# Patient Record
Sex: FEMALE | Race: WHITE
[De-identification: names, ages, dates, MRNs, and addresses within clinical notes are randomized per-mention and may not be internally consistent; named-entity substitution may affect disease eponyms.]

---

## 2018-07-13 ENCOUNTER — HOSPITAL ENCOUNTER (EMERGENCY)
Dept: HOSPITAL 47 - EC | Age: 43
Discharge: HOME | End: 2018-07-13
Payer: COMMERCIAL

## 2018-07-13 VITALS
TEMPERATURE: 97.4 F | RESPIRATION RATE: 17 BRPM | DIASTOLIC BLOOD PRESSURE: 84 MMHG | HEART RATE: 72 BPM | SYSTOLIC BLOOD PRESSURE: 134 MMHG

## 2018-07-13 DIAGNOSIS — I44.0: ICD-10-CM

## 2018-07-13 DIAGNOSIS — R74.8: ICD-10-CM

## 2018-07-13 DIAGNOSIS — R11.0: ICD-10-CM

## 2018-07-13 DIAGNOSIS — Z79.51: ICD-10-CM

## 2018-07-13 DIAGNOSIS — E07.9: ICD-10-CM

## 2018-07-13 DIAGNOSIS — Z79.899: ICD-10-CM

## 2018-07-13 DIAGNOSIS — E87.6: Primary | ICD-10-CM

## 2018-07-13 DIAGNOSIS — F41.9: ICD-10-CM

## 2018-07-13 DIAGNOSIS — Z90.710: ICD-10-CM

## 2018-07-13 DIAGNOSIS — I10: ICD-10-CM

## 2018-07-13 DIAGNOSIS — F32.9: ICD-10-CM

## 2018-07-13 DIAGNOSIS — Z88.5: ICD-10-CM

## 2018-07-13 DIAGNOSIS — R14.0: ICD-10-CM

## 2018-07-13 DIAGNOSIS — Z90.49: ICD-10-CM

## 2018-07-13 LAB
ALBUMIN SERPL-MCNC: 3.2 G/DL (ref 3.5–5)
ALP SERPL-CCNC: 49 U/L (ref 38–126)
ALT SERPL-CCNC: 102 U/L (ref 9–52)
AMYLASE SERPL-CCNC: 44 U/L (ref 30–110)
ANION GAP SERPL CALC-SCNC: 9 MMOL/L
AST SERPL-CCNC: 154 U/L (ref 14–36)
BASOPHILS # BLD AUTO: 0.1 K/UL (ref 0–0.2)
BASOPHILS NFR BLD AUTO: 1 %
BUN SERPL-SCNC: 12 MG/DL (ref 7–17)
CALCIUM SPEC-MCNC: 7.3 MG/DL (ref 8.4–10.2)
CHLORIDE SERPL-SCNC: 112 MMOL/L (ref 98–107)
CO2 SERPL-SCNC: 20 MMOL/L (ref 22–30)
EOSINOPHIL # BLD AUTO: 0.2 K/UL (ref 0–0.7)
EOSINOPHIL NFR BLD AUTO: 2 %
ERYTHROCYTE [DISTWIDTH] IN BLOOD BY AUTOMATED COUNT: 4.05 M/UL (ref 3.8–5.4)
ERYTHROCYTE [DISTWIDTH] IN BLOOD: 13.5 % (ref 11.5–15.5)
GLUCOSE SERPL-MCNC: 85 MG/DL (ref 74–99)
HCT VFR BLD AUTO: 36.6 % (ref 34–46)
HGB BLD-MCNC: 12.2 GM/DL (ref 11.4–16)
LIPASE SERPL-CCNC: 171 U/L (ref 23–300)
LYMPHOCYTES # SPEC AUTO: 1.4 K/UL (ref 1–4.8)
LYMPHOCYTES NFR SPEC AUTO: 14 %
MCH RBC QN AUTO: 30.2 PG (ref 25–35)
MCHC RBC AUTO-ENTMCNC: 33.4 G/DL (ref 31–37)
MCV RBC AUTO: 90.4 FL (ref 80–100)
MONOCYTES # BLD AUTO: 0.6 K/UL (ref 0–1)
MONOCYTES NFR BLD AUTO: 6 %
NEUTROPHILS # BLD AUTO: 7.6 K/UL (ref 1.3–7.7)
NEUTROPHILS NFR BLD AUTO: 76 %
PH UR: 5 [PH] (ref 5–8)
PLATELET # BLD AUTO: 254 K/UL (ref 150–450)
POTASSIUM SERPL-SCNC: 2.9 MMOL/L (ref 3.5–5.1)
PROT SERPL-MCNC: 5.7 G/DL (ref 6.3–8.2)
RBC UR QL: 2 /HPF (ref 0–5)
SODIUM SERPL-SCNC: 141 MMOL/L (ref 137–145)
SP GR UR: 1.01 (ref 1–1.03)
SQUAMOUS UR QL AUTO: 7 /HPF (ref 0–4)
UROBILINOGEN UR QL STRIP: <2 MG/DL (ref ?–2)
WBC # BLD AUTO: 10 K/UL (ref 3.8–10.6)
WBC #/AREA URNS HPF: 4 /HPF (ref 0–5)

## 2018-07-13 PROCEDURE — 82150 ASSAY OF AMYLASE: CPT

## 2018-07-13 PROCEDURE — 83690 ASSAY OF LIPASE: CPT

## 2018-07-13 PROCEDURE — 96374 THER/PROPH/DIAG INJ IV PUSH: CPT

## 2018-07-13 PROCEDURE — 85025 COMPLETE CBC W/AUTO DIFF WBC: CPT

## 2018-07-13 PROCEDURE — 99284 EMERGENCY DEPT VISIT MOD MDM: CPT

## 2018-07-13 PROCEDURE — 93005 ELECTROCARDIOGRAM TRACING: CPT

## 2018-07-13 PROCEDURE — 96372 THER/PROPH/DIAG INJ SC/IM: CPT

## 2018-07-13 PROCEDURE — 96361 HYDRATE IV INFUSION ADD-ON: CPT

## 2018-07-13 PROCEDURE — 36415 COLL VENOUS BLD VENIPUNCTURE: CPT

## 2018-07-13 PROCEDURE — 74018 RADEX ABDOMEN 1 VIEW: CPT

## 2018-07-13 PROCEDURE — 80053 COMPREHEN METABOLIC PANEL: CPT

## 2018-07-13 PROCEDURE — 81001 URINALYSIS AUTO W/SCOPE: CPT

## 2018-07-13 PROCEDURE — 96375 TX/PRO/DX INJ NEW DRUG ADDON: CPT

## 2018-07-13 NOTE — ED
Abdominal Pain HPI





- General


Chief Complaint: Abdominal Pain


Stated Complaint: abd pain


Time Seen by Provider: 07/13/18 19:10


Source: patient


Mode of arrival: ambulatory


Limitations: no limitations





- History of Present Illness


Initial Comments: 


43-year-old female patient presents to emergency department today for 

evaluation of upper abdominal cramping and bloating.  Patient states that 

earlier today she is experiencing some nausea.  States that she ate dinner and 

felt better after this.  Patient states that gradually her symptoms returned.  

States that she has become bloated.  States that she is having severe cramping 

abdominal pain and nausea.  Patient denies any vomiting or diarrhea with this.  

Patient denies history of similar symptoms.  Denies any radiation of the pain 

through to her back.  Denies any shortness of breath or chest pain with this.  

Patient does have a past medical history significant for hiatal hernia and acid 

reflux.  She has had cholecystectomy and hysterectomy.  She denies any fever or 

chills.  Denies any hematuria, dysuria, urinary frequency, urinary urgency.  

Patient denies any recent rash, numbness, tingling, dizziness, weakness, 

hematuria, dysuria, urinary urgency, urinary frequency, headache, visual changes

, or any other complaints.








- Related Data


 Home Medications











 Medication  Instructions  Recorded  Confirmed


 


Albuterol Inhaler [Ventolin Hfa 1 - 2 puff INHALATION RT-Q6H PRN 07/13/18 07/13/ 18





Inhaler]   


 


Albuterol Nebulized [Ventolin 2.5 mg INHALATION RT-QID PRN 07/13/18 07/13/18





Nebulized]   


 


DULoxetine HCL [Cymbalta] 60 mg PO DAILY 07/13/18 07/13/18


 


Fluticasone/Salmeterol [Advair 1 puff INHALATION RT-BID 07/13/18 07/13/18





500-50 Diskus]   


 


Hydrochlorothiazide 25 mg PO DAILY 07/13/18 07/13/18


 


Levothyroxine Sodium [Synthroid] 25 mcg PO DAILY 07/13/18 07/13/18


 


Metoprolol Tartrate [Lopressor] 25 mg PO HS 07/13/18 07/13/18


 


Potassium Chloride ER [K-Dur 20] 20 meq PO DAILY 07/13/18 07/13/18


 


SUMAtriptan SUCCINATE [Imitrex] 100 mg PO ONCE PRN 07/13/18 07/13/18


 


Zonisamide [Zonegran] 100 mg PO DAILY 07/13/18 07/13/18


 


busPIRone HCL 15 mg PO BID 07/13/18 07/13/18











 Allergies











Allergy/AdvReac Type Severity Reaction Status Date / Time


 


codeine Allergy  Rash/Hives Verified 07/13/18 19:10














Review of Systems


ROS Statement: 


Those systems with pertinent positive or pertinent negative responses have been 

documented in the HPI.





ROS Other: All systems not noted in ROS Statement are negative.





Past Medical History


Past Medical History: Hypertension, Thyroid Disorder


History of Any Multi-Drug Resistant Organisms: None Reported


Past Surgical History: Cholecystectomy, Hysterectomy, Tonsillectomy


Past Psychological History: Anxiety, Depression


Smoking Status: Never smoker


Past Alcohol Use History: None Reported


Past Drug Use History: None Reported





General Exam


Limitations: no limitations


General appearance: alert, in no apparent distress, other (This is a well-

developed, well-nourished adult female patient in no acute distress.  Vital 

signs upon presentation are temperature 98.0F, pulse 80, respirations 18, 

blood pressure 143/83, pulse ox 99% on room air.)


Eye exam: Present: normal appearance, PERRL, EOMI.  Absent: scleral icterus, 

conjunctival injection, periorbital swelling


ENT exam: Present: normal exam, normal oropharynx, mucous membranes moist


Respiratory exam: Present: normal lung sounds bilaterally.  Absent: respiratory 

distress, wheezes, rales, rhonchi, stridor


Cardiovascular Exam: Present: regular rate, normal rhythm, normal heart sounds.

  Absent: systolic murmur, diastolic murmur, rubs, gallop, clicks


GI/Abdominal exam: Present: soft, distended (Mild), tenderness (Midepigastric 

tenderness), normal bowel sounds.  Absent: guarding, rebound, rigid


Neurological exam: Present: alert, oriented X3, CN II-XII intact


Psychiatric exam: Present: normal affect, normal mood


Skin exam: Present: warm, dry, intact, normal color.  Absent: rash





Course


 Vital Signs











  07/13/18 07/13/18





  19:03 22:59


 


Temperature 98 F 97.4 F L


 


Pulse Rate 80 72


 


Respiratory 18 17





Rate  


 


Blood Pressure 143/83 134/84


 


O2 Sat by Pulse 99 98





Oximetry  














Medical Decision Making





- Medical Decision Making


43-year-old female patient presents the emergency department today for 

evaluation of generalized abdominal cramping and bloating.  Physical 

examination did reveal some mild generalized tenderness.  Labs reviewed and 

revealed a decreased potassium at 2.9.  I did discuss findings and results with 

the patient, she states she does occasionally have abdominal cramping when her 

potassium gets low.  We'll replace her with 40 mEq.  She does take 20 mEq daily 

at home.  She is instructed to follow-up with her doctor for recheck in 1-2 

days.  She is instructed to have repeat liver enzymes and potassium testing.  

Return parameters were discussed in detail.  She verbalizes understanding and 

agrees with this plan.








- Lab Data


Result diagrams: 


 07/13/18 20:32





 07/13/18 20:32


 Lab Results











  07/13/18 07/13/18 07/13/18 Range/Units





  20:32 20:32 21:05 


 


WBC   10.0   (3.8-10.6)  k/uL


 


RBC   4.05   (3.80-5.40)  m/uL


 


Hgb   12.2   (11.4-16.0)  gm/dL


 


Hct   36.6   (34.0-46.0)  %


 


MCV   90.4   (80.0-100.0)  fL


 


MCH   30.2   (25.0-35.0)  pg


 


MCHC   33.4   (31.0-37.0)  g/dL


 


RDW   13.5   (11.5-15.5)  %


 


Plt Count   254   (150-450)  k/uL


 


Neutrophils %   76   %


 


Lymphocytes %   14   %


 


Monocytes %   6   %


 


Eosinophils %   2   %


 


Basophils %   1   %


 


Neutrophils #   7.6   (1.3-7.7)  k/uL


 


Lymphocytes #   1.4   (1.0-4.8)  k/uL


 


Monocytes #   0.6   (0-1.0)  k/uL


 


Eosinophils #   0.2   (0-0.7)  k/uL


 


Basophils #   0.1   (0-0.2)  k/uL


 


Sodium  141    (137-145)  mmol/L


 


Potassium  2.9 L*    (3.5-5.1)  mmol/L


 


Chloride  112 H    ()  mmol/L


 


Carbon Dioxide  20 L    (22-30)  mmol/L


 


Anion Gap  9    mmol/L


 


BUN  12    (7-17)  mg/dL


 


Creatinine  0.66    (0.52-1.04)  mg/dL


 


Est GFR (CKD-EPI)AfAm  >90    (>60 ml/min/1.73 sqM)  


 


Est GFR (CKD-EPI)NonAf  >90    (>60 ml/min/1.73 sqM)  


 


Glucose  85    (74-99)  mg/dL


 


Calcium  7.3 L    (8.4-10.2)  mg/dL


 


Total Bilirubin  0.3    (0.2-1.3)  mg/dL


 


AST  154 H    (14-36)  U/L


 


ALT  102 H    (9-52)  U/L


 


Alkaline Phosphatase  49    ()  U/L


 


Total Protein  5.7 L    (6.3-8.2)  g/dL


 


Albumin  3.2 L    (3.5-5.0)  g/dL


 


Amylase  44    ()  U/L


 


Lipase  171    ()  U/L


 


Urine Color    Light Yellow  


 


Urine Appearance    Clear  (Clear)  


 


Urine pH    5.0  (5.0-8.0)  


 


Ur Specific Gravity    1.006  (1.001-1.035)  


 


Urine Protein    Negative  (Negative)  


 


Urine Glucose (UA)    Negative  (Negative)  


 


Urine Ketones    Negative  (Negative)  


 


Urine Blood    Negative  (Negative)  


 


Urine Nitrite    Negative  (Negative)  


 


Urine Bilirubin    Negative  (Negative)  


 


Urine Urobilinogen    <2.0  (<2.0)  mg/dL


 


Ur Leukocyte Esterase    Small H  (Negative)  


 


Urine RBC    2  (0-5)  /hpf


 


Urine WBC    4  (0-5)  /hpf


 


Ur Squamous Epith Cells    7 H  (0-4)  /hpf


 


Amorphous Sediment    Rare H  (None)  /hpf


 


Urine Bacteria    Rare H  (None)  /hpf














- Radiology Data


Radiology results: report reviewed, image reviewed


2 views of the abdomen are obtained.  The visualized lung bases and pleural 

spaces are negative.  No pneumoperitoneum or pneumatosis.  Bowel gas pattern is 

unremarkable, except for excessive pain colonic stool volume.  There are no 

acute skeletal or soft tissue findings evident.  Impression by Dr. Tonia Bashir shows negative examination.





Disposition


Clinical Impression: 


 Abdominal pain, Elevated liver enzymes, Hypokalemia





Disposition: HOME SELF-CARE


Condition: Good


Instructions:  Hypokalemia (ED), Abdominal Pain (ED)


Additional Instructions: 


Start clear liquid diet and advance as tolerated.  Follow-up with her primary 

care physician for recheck in 1-2 days.  Return here immediately for any new, 

worsening, or concerning symptoms.


Is patient prescribed a controlled substance at d/c from ED?: No


Referrals: 


Nonstaff,Physician [Primary Care Provider] - 1-2 days


Time of Disposition: 22:46

## 2018-07-13 NOTE — XR
EXAMINATION TYPE: XR KUB 2 views

 

DATE OF EXAM: 7/13/2018

 

COMPARISON: NONE

 

HISTORY: Abdominal pain

 

TECHNIQUE: 2 upright views

 

FINDINGS: 

The visualized lung bases and pleural spaces are negative. 

 

No pneumoperitoneum or pneumatosis. The bowel gas pattern is unremarkable, except for excessive panco
lonic stool volume.

 

There are no acute skeletal or soft tissue findings evident.

 

IMPRESSION: Negative examination.